# Patient Record
Sex: FEMALE | Race: OTHER | HISPANIC OR LATINO | URBAN - METROPOLITAN AREA
[De-identification: names, ages, dates, MRNs, and addresses within clinical notes are randomized per-mention and may not be internally consistent; named-entity substitution may affect disease eponyms.]

---

## 2022-05-04 ENCOUNTER — OUTPATIENT (OUTPATIENT)
Dept: OUTPATIENT SERVICES | Facility: HOSPITAL | Age: 27
LOS: 1 days | End: 2022-05-04
Payer: MEDICAID

## 2022-05-04 ENCOUNTER — RESULT REVIEW (OUTPATIENT)
Age: 27
End: 2022-05-04

## 2022-05-04 ENCOUNTER — APPOINTMENT (OUTPATIENT)
Dept: OBGYN | Facility: HOSPITAL | Age: 27
End: 2022-05-04
Payer: MEDICAID

## 2022-05-04 VITALS
BODY MASS INDEX: 47.38 KG/M2 | SYSTOLIC BLOOD PRESSURE: 144 MMHG | HEIGHT: 65 IN | HEART RATE: 93 BPM | WEIGHT: 284.4 LBS | TEMPERATURE: 97.6 F | DIASTOLIC BLOOD PRESSURE: 83 MMHG

## 2022-05-04 LAB
A1C WITH ESTIMATED AVERAGE GLUCOSE RESULT: 5.5 % — SIGNIFICANT CHANGE UP (ref 4–5.6)
ALBUMIN SERPL ELPH-MCNC: 4.3 G/DL — SIGNIFICANT CHANGE UP (ref 3.3–5)
ALP SERPL-CCNC: 65 U/L — SIGNIFICANT CHANGE UP (ref 40–120)
ALT FLD-CCNC: 22 U/L — SIGNIFICANT CHANGE UP (ref 4–33)
ANION GAP SERPL CALC-SCNC: 10 MMOL/L — SIGNIFICANT CHANGE UP (ref 7–14)
AST SERPL-CCNC: 17 U/L — SIGNIFICANT CHANGE UP (ref 4–32)
BASOPHILS # BLD AUTO: 0.05 K/UL — SIGNIFICANT CHANGE UP (ref 0–0.2)
BASOPHILS NFR BLD AUTO: 0.5 % — SIGNIFICANT CHANGE UP (ref 0–2)
BILIRUB SERPL-MCNC: <0.2 MG/DL — SIGNIFICANT CHANGE UP (ref 0.2–1.2)
BUN SERPL-MCNC: 9 MG/DL — SIGNIFICANT CHANGE UP (ref 7–23)
CALCIUM SERPL-MCNC: 9 MG/DL — SIGNIFICANT CHANGE UP (ref 8.4–10.5)
CHLORIDE SERPL-SCNC: 105 MMOL/L — SIGNIFICANT CHANGE UP (ref 98–107)
CO2 SERPL-SCNC: 23 MMOL/L — SIGNIFICANT CHANGE UP (ref 22–31)
CREAT SERPL-MCNC: 0.65 MG/DL — SIGNIFICANT CHANGE UP (ref 0.5–1.3)
EGFR: 124 ML/MIN/1.73M2 — SIGNIFICANT CHANGE UP
EOSINOPHIL # BLD AUTO: 0.18 K/UL — SIGNIFICANT CHANGE UP (ref 0–0.5)
EOSINOPHIL NFR BLD AUTO: 2 % — SIGNIFICANT CHANGE UP (ref 0–6)
ESTIMATED AVERAGE GLUCOSE: 111 — SIGNIFICANT CHANGE UP
GLUCOSE SERPL-MCNC: 98 MG/DL — SIGNIFICANT CHANGE UP (ref 70–99)
HCT VFR BLD CALC: 27.9 % — LOW (ref 34.5–45)
HGB BLD-MCNC: 8.4 G/DL — LOW (ref 11.5–15.5)
IANC: 5.64 K/UL — SIGNIFICANT CHANGE UP (ref 1.8–7.4)
IMM GRANULOCYTES NFR BLD AUTO: 0.4 % — SIGNIFICANT CHANGE UP (ref 0–1.5)
LYMPHOCYTES # BLD AUTO: 2.72 K/UL — SIGNIFICANT CHANGE UP (ref 1–3.3)
LYMPHOCYTES # BLD AUTO: 29.6 % — SIGNIFICANT CHANGE UP (ref 13–44)
MCHC RBC-ENTMCNC: 22.1 PG — LOW (ref 27–34)
MCHC RBC-ENTMCNC: 30.1 GM/DL — LOW (ref 32–36)
MCV RBC AUTO: 73.4 FL — LOW (ref 80–100)
MONOCYTES # BLD AUTO: 0.56 K/UL — SIGNIFICANT CHANGE UP (ref 0–0.9)
MONOCYTES NFR BLD AUTO: 6.1 % — SIGNIFICANT CHANGE UP (ref 2–14)
NEUTROPHILS # BLD AUTO: 5.64 K/UL — SIGNIFICANT CHANGE UP (ref 1.8–7.4)
NEUTROPHILS NFR BLD AUTO: 61.4 % — SIGNIFICANT CHANGE UP (ref 43–77)
NRBC # BLD: 0 /100 WBCS — SIGNIFICANT CHANGE UP
NRBC # FLD: 0 K/UL — SIGNIFICANT CHANGE UP
PLATELET # BLD AUTO: 357 K/UL — SIGNIFICANT CHANGE UP (ref 150–400)
POTASSIUM SERPL-MCNC: 3.8 MMOL/L — SIGNIFICANT CHANGE UP (ref 3.5–5.3)
POTASSIUM SERPL-SCNC: 3.8 MMOL/L — SIGNIFICANT CHANGE UP (ref 3.5–5.3)
PROT SERPL-MCNC: 7 G/DL — SIGNIFICANT CHANGE UP (ref 6–8.3)
RBC # BLD: 3.8 M/UL — SIGNIFICANT CHANGE UP (ref 3.8–5.2)
RBC # FLD: 16.6 % — HIGH (ref 10.3–14.5)
SODIUM SERPL-SCNC: 138 MMOL/L — SIGNIFICANT CHANGE UP (ref 135–145)
T4 FREE SERPL-MCNC: 1.2 NG/DL — SIGNIFICANT CHANGE UP (ref 0.9–1.8)
TSH SERPL-MCNC: 1.25 UIU/ML — SIGNIFICANT CHANGE UP (ref 0.27–4.2)
WBC # BLD: 9.19 K/UL — SIGNIFICANT CHANGE UP (ref 3.8–10.5)
WBC # FLD AUTO: 9.19 K/UL — SIGNIFICANT CHANGE UP (ref 3.8–10.5)

## 2022-05-04 PROCEDURE — 88141 CYTOPATH C/V INTERPRET: CPT

## 2022-05-04 PROCEDURE — 99203 OFFICE O/P NEW LOW 30 MIN: CPT | Mod: GE

## 2022-05-04 RX ORDER — CHOLECALCIFEROL (VITAMIN D3) 25 MCG
27-0.8-2 TABLET,CHEWABLE ORAL
Qty: 30 | Refills: 4 | Status: ACTIVE | COMMUNITY
Start: 2022-05-04 | End: 1900-01-01

## 2022-05-04 NOTE — HISTORY OF PRESENT ILLNESS
[FreeTextEntry1] :  #[id number]\par \par HPI: 28yo G0, LMP 12/8/21, who presents for x4mo of AUB. Endorses using x4-5 soaked pads/day since the start of her menses on the aforementioned date. Reports this has happened in the past when she was in [facility] and she was diagnosed with PCOS and was successfully treated w/ OCPs. Menarche was at 14yo and since then, she has had 1-2menses/ year lasting up to x3 weeks. Says she feels fatigued. Denies any chest pain or difficulty breathing. Was seen at an outside facility where diagnostics revealed bilateral ovarian cysts (3.1cm rt and 2.4cm lt) and anemia w/ a hgb of 10.3. \par \par Of note, desires to become pregnant. Has been trying for 5-6 years with partner \par \par Previous Ob/Gyn: Dr. Malone in [place]\par \par ObHx: G0\par GynHx: Reports nl pap 4yo ago. Left ovarian cyst as below. Denies any hx of STDs. Monogamous w/ male partner \par MedHx: Denies \par SurgHx: Left ovarian cystectomy @ 17yo in [facility] \par Meds: None \par Allergies: NKDA\par FamHx: Denies any family hx of uterine, ovarian, or breast cancer \par

## 2022-05-04 NOTE — PHYSICAL EXAM
[Chaperone Present] : A chaperone was present in the examining room during all aspects of the physical examination [Appropriately responsive] : appropriately responsive [Alert] : alert [No Acute Distress] : no acute distress [Soft] : soft [Non-tender] : non-tender [Non-distended] : non-distended [FreeTextEntry3] : Acanthosis nigracans appreciated. Hirsute upper lip noted  [FreeTextEntry1] : No vulvar lesions seen  [FreeTextEntry4] : Blood seen in the vault, cleared w/ scopettes  [FreeTextEntry5] : No lesions appreciated on cervix  [FreeTextEntry8] : Uterus mobile and non-tender. Adnexa non-tender and w/o fullness bilaterally. Exam limited 2/2 to habitus

## 2022-05-04 NOTE — PLAN
[FreeTextEntry1] : 28yo G0, LMP 12/8/2021, who presents for evaluation of longstanding hx of AUB\par \par #AUB\par - Likely 2/2 to PCOS given evidence of hirsutism and anovulation\par - WIll start pt on x1 week of Norethindrone acetate 10mg and have her have a TVUS the week after her withdrawal bleed\par - Labs ordered and drawn: TSH, HbA1c, CBC, CMP, and PRL\par - Recommended patient pursue weight loss\par \par #HM\par - Pap obtained\par - Pt declined STD testing\par \par Sagar Clarke, PGY-1\par d/w Dr. Valverde\par \par RTC after completion of TVUS

## 2022-05-05 DIAGNOSIS — N93.9 ABNORMAL UTERINE AND VAGINAL BLEEDING, UNSPECIFIED: ICD-10-CM

## 2022-05-09 LAB — CYTOLOGY SPEC DOC CYTO: SIGNIFICANT CHANGE UP

## 2022-05-12 ENCOUNTER — NON-APPOINTMENT (OUTPATIENT)
Age: 27
End: 2022-05-12

## 2022-05-29 ENCOUNTER — APPOINTMENT (OUTPATIENT)
Dept: ULTRASOUND IMAGING | Facility: IMAGING CENTER | Age: 27
End: 2022-05-29
Payer: MEDICAID

## 2022-05-29 ENCOUNTER — OUTPATIENT (OUTPATIENT)
Dept: OUTPATIENT SERVICES | Facility: HOSPITAL | Age: 27
LOS: 1 days | End: 2022-05-29
Payer: MEDICAID

## 2022-05-29 DIAGNOSIS — N93.9 ABNORMAL UTERINE AND VAGINAL BLEEDING, UNSPECIFIED: ICD-10-CM

## 2022-05-29 PROCEDURE — 76830 TRANSVAGINAL US NON-OB: CPT

## 2022-05-29 PROCEDURE — 76830 TRANSVAGINAL US NON-OB: CPT | Mod: 26

## 2022-06-02 ENCOUNTER — NON-APPOINTMENT (OUTPATIENT)
Age: 27
End: 2022-06-02

## 2022-07-20 ENCOUNTER — RESULT REVIEW (OUTPATIENT)
Age: 27
End: 2022-07-20

## 2022-07-20 ENCOUNTER — OUTPATIENT (OUTPATIENT)
Dept: OUTPATIENT SERVICES | Facility: HOSPITAL | Age: 27
LOS: 1 days | End: 2022-07-20

## 2022-07-20 ENCOUNTER — APPOINTMENT (OUTPATIENT)
Dept: OBGYN | Facility: HOSPITAL | Age: 27
End: 2022-07-20

## 2022-07-20 VITALS
HEIGHT: 65 IN | BODY MASS INDEX: 47.65 KG/M2 | SYSTOLIC BLOOD PRESSURE: 124 MMHG | DIASTOLIC BLOOD PRESSURE: 80 MMHG | WEIGHT: 286 LBS | HEART RATE: 87 BPM | TEMPERATURE: 97.6 F

## 2022-07-20 DIAGNOSIS — Z00.00 ENCOUNTER FOR GENERAL ADULT MEDICAL EXAMINATION W/OUT ABNORMAL FINDINGS: ICD-10-CM

## 2022-07-20 PROCEDURE — 99213 OFFICE O/P EST LOW 20 MIN: CPT | Mod: GE

## 2022-07-20 NOTE — PHYSICAL EXAM
[Chaperone Present] : A chaperone was present in the examining room during all aspects of the physical examination [Appropriately responsive] : appropriately responsive [Alert] : alert [No Acute Distress] : no acute distress [Soft] : soft [Non-tender] : non-tender [Non-distended] : non-distended [Oriented x3] : oriented x3 [Labia Majora] : normal [Labia Minora] : normal [Normal] : normal [Uterine Adnexae] : normal

## 2022-07-21 DIAGNOSIS — E28.2 POLYCYSTIC OVARIAN SYNDROME: ICD-10-CM

## 2022-07-21 DIAGNOSIS — Z00.00 ENCOUNTER FOR GENERAL ADULT MEDICAL EXAMINATION WITHOUT ABNORMAL FINDINGS: ICD-10-CM

## 2022-07-25 LAB — CYTOLOGY SPEC DOC CYTO: SIGNIFICANT CHANGE UP

## 2022-07-26 PROBLEM — Z00.00 ENCOUNTER FOR PREVENTIVE HEALTH EXAMINATION: Status: ACTIVE | Noted: 2022-04-29

## 2022-07-26 NOTE — PLAN
[FreeTextEntry1] : 26yo G0, LMP 6/29/22 with hx of PCOS who presents for follow up and repeat pap smear due to insufficient sample. Had provera withdrawal bleed, has not menstruated since. Lab work wnl, TVUS showing multiple cysts. Dx PCOS given hirsutism and TVUS. \par \par #PCOS\par - Pt desires to get pregnant. Declines OCPs or IUD at this time. Pt s/p Provera withdrawal bleed\par - Continue to keep menstrual diary \par - Pt encouraged to use ovulation predictor kits now that she has had a provera withdrawal bleed\par - Pt counseled on weight loss and exercise regimen \par \par #HCM\par - Repeat pap smear performed \par \par Alba Fishman, PGY2\par d/w Dr. Valverde

## 2022-07-26 NOTE — HISTORY OF PRESENT ILLNESS
[FreeTextEntry1] : HPI: 26yo G0, LMP 6/29/22 with hx of PCOS who presents for follow up and repeat pap smear due to insufficient sample. Pt was seen in May with AUB. Workup was sent and pt was sent for TVUS. Provera was also sent for the pt and she had a withdrawal bleed on 6/29/22. Pt has not menstruated since then. Pt is trying to become pregnant. \par \par TVUS: uterus 7.7cm x 4.1cm x 6.1 cm. Bilateral simple cysts, multiple. Largest measuring 2.8 x 3.4 x 2.6 cm. \par TSH, Free T4 wnl \par A1C 5.5%\par \par ObHx: G0\par GynHx: PCOS diagnosed in [facility] originally, treated with OCPs successfully. Hx of ovarian cysts. Menarche at age 13year old, 1-2 menses per year lasting about 3 weeks.  Denies any hx of STDs. Monogamous w/ male partner \par MedHx: Denies \par SurgHx: Left ovarian cystectomy @ 17yo in [facility] \par Meds: None \par Allergies: NKDA\par FamHx: Denies any family hx of uterine, ovarian, or breast cancer \par

## 2022-07-26 NOTE — REVIEW OF SYSTEMS
[Fever] : no fever [Chills] : no chills [Cough] : no cough [SOB on Exertion] : no shortness of breath on exertion [Chest Pain] : no chest pain [Palpitations] : no palpitations [Vomiting] : no vomiting [Nausea] : no nausea

## 2022-12-14 ENCOUNTER — RESULT REVIEW (OUTPATIENT)
Age: 27
End: 2022-12-14

## 2022-12-14 ENCOUNTER — OUTPATIENT (OUTPATIENT)
Dept: OUTPATIENT SERVICES | Facility: HOSPITAL | Age: 27
LOS: 1 days | End: 2022-12-14

## 2022-12-14 ENCOUNTER — APPOINTMENT (OUTPATIENT)
Dept: OBGYN | Facility: HOSPITAL | Age: 27
End: 2022-12-14

## 2022-12-14 VITALS
DIASTOLIC BLOOD PRESSURE: 92 MMHG | SYSTOLIC BLOOD PRESSURE: 145 MMHG | BODY MASS INDEX: 46.98 KG/M2 | TEMPERATURE: 98.1 F | WEIGHT: 282 LBS | HEIGHT: 65 IN | HEART RATE: 79 BPM

## 2022-12-14 DIAGNOSIS — E66.01 MORBID (SEVERE) OBESITY DUE TO EXCESS CALORIES: ICD-10-CM

## 2022-12-14 LAB
BASOPHILS # BLD AUTO: 0.08 K/UL — SIGNIFICANT CHANGE UP (ref 0–0.2)
BASOPHILS NFR BLD AUTO: 0.7 % — SIGNIFICANT CHANGE UP (ref 0–2)
EOSINOPHIL # BLD AUTO: 0.26 K/UL — SIGNIFICANT CHANGE UP (ref 0–0.5)
EOSINOPHIL NFR BLD AUTO: 2.3 % — SIGNIFICANT CHANGE UP (ref 0–6)
HCT VFR BLD CALC: 34.5 % — SIGNIFICANT CHANGE UP (ref 34.5–45)
HGB BLD-MCNC: 10.1 G/DL — LOW (ref 11.5–15.5)
IANC: 6.91 K/UL — SIGNIFICANT CHANGE UP (ref 1.8–7.4)
IMM GRANULOCYTES NFR BLD AUTO: 0.3 % — SIGNIFICANT CHANGE UP (ref 0–0.9)
LYMPHOCYTES # BLD AUTO: 28 % — SIGNIFICANT CHANGE UP (ref 13–44)
LYMPHOCYTES # BLD AUTO: 3.12 K/UL — SIGNIFICANT CHANGE UP (ref 1–3.3)
MCHC RBC-ENTMCNC: 20.3 PG — LOW (ref 27–34)
MCHC RBC-ENTMCNC: 29.3 GM/DL — LOW (ref 32–36)
MCV RBC AUTO: 69.3 FL — LOW (ref 80–100)
MONOCYTES # BLD AUTO: 0.74 K/UL — SIGNIFICANT CHANGE UP (ref 0–0.9)
MONOCYTES NFR BLD AUTO: 6.6 % — SIGNIFICANT CHANGE UP (ref 2–14)
NEUTROPHILS # BLD AUTO: 6.91 K/UL — SIGNIFICANT CHANGE UP (ref 1.8–7.4)
NEUTROPHILS NFR BLD AUTO: 62.1 % — SIGNIFICANT CHANGE UP (ref 43–77)
NRBC # BLD: 0 /100 WBCS — SIGNIFICANT CHANGE UP (ref 0–0)
NRBC # FLD: 0 K/UL — SIGNIFICANT CHANGE UP (ref 0–0)
PLATELET # BLD AUTO: 326 K/UL — SIGNIFICANT CHANGE UP (ref 150–400)
RBC # BLD: 4.98 M/UL — SIGNIFICANT CHANGE UP (ref 3.8–5.2)
RBC # FLD: 16.9 % — HIGH (ref 10.3–14.5)
WBC # BLD: 11.14 K/UL — HIGH (ref 3.8–10.5)
WBC # FLD AUTO: 11.14 K/UL — HIGH (ref 3.8–10.5)

## 2022-12-14 PROCEDURE — 99213 OFFICE O/P EST LOW 20 MIN: CPT | Mod: GC

## 2022-12-14 NOTE — PHYSICAL EXAM
[Appropriately responsive] : appropriately responsive [Alert] : alert [No Acute Distress] : no acute distress [No Lymphadenopathy] : no lymphadenopathy [Regular Rate Rhythm] : regular rate rhythm [Clear to Auscultation B/L] : clear to auscultation bilaterally [Soft] : soft [Non-tender] : non-tender [Non-distended] : non-distended [No Mass] : no mass [Oriented x3] : oriented x3

## 2022-12-15 DIAGNOSIS — E28.2 POLYCYSTIC OVARIAN SYNDROME: ICD-10-CM

## 2022-12-15 DIAGNOSIS — E66.01 MORBID (SEVERE) OBESITY DUE TO EXCESS CALORIES: ICD-10-CM

## 2022-12-15 DIAGNOSIS — N93.9 ABNORMAL UTERINE AND VAGINAL BLEEDING, UNSPECIFIED: ICD-10-CM

## 2022-12-15 DIAGNOSIS — R42 DIZZINESS AND GIDDINESS: ICD-10-CM

## 2022-12-15 NOTE — PLAN
[FreeTextEntry1] : 28 yo G0, LMP 11/1 who presents with AUB for the last 6 weeks and signs/symptoms of anemia. \par \par #AUB\par - Likely 2/2 to anovulatory cycles in the setting of PCOS\par - CBC today; if H/H less then 7/21, will alert patient about possible need for blood transfusion\par - Recommend Vit C and Fe daily in the setting of persistent menstrual bleeding\par \par #PCOS\par - Patient encouraged to use ovulation predictor kit once she is no longer bleeding\par - instructed to have intercourse every other day once ovulating, total 5 times. Then, after 2 weeks to take a urine pregnancy test if her period does not resume\par - Instructed patient to follow-up to clinic after that, regardless of UPT results\par - If UPT is negative, patient is a candidate for letrozole or for repeat provera challenge and to resume scheduled intercourse after her withdrawal bleeding\par - Pt counseled on importance of weight loss and exercise for PCOS as well as for future pregnancies\par \par \par Patient d/w Dr. Negron\par Sohail Nelson, PGY1

## 2022-12-15 NOTE — END OF VISIT
[] : Resident [FreeTextEntry3] : AUB in the setting of anovulatory cycles 2/2 PCOS\par Pt desires pregnancy, discussed timed intercourse\par Can consider ovulation induction in the future if needed\par \par giovanni HUANG

## 2022-12-15 NOTE — REVIEW OF SYSTEMS
[Palpitations] : palpitations [Abn Vaginal bleeding] : abnormal vaginal bleeding [Dizziness] : dizziness [Negative] : Musculoskeletal [Fever] : no fever [Chest Pain] : no chest pain [Headache] : no headache [Fainting] : no fainting

## 2022-12-15 NOTE — HISTORY OF PRESENT ILLNESS
[FreeTextEntry1] : 28 yo, G0, with PMHx of PCOS, LMP 11/1/22, who presents with continued vaginal bleeding since 11/1 and with questions about fertility. Patient was seen in May with AUB, and which time Provera was also sent for the pt, and she had a withdrawal bleed on 6/29/22. This is her first period since June. Patient still desires fertility and would like to know what her options are. \par Patient states she has had days of heavy bleeding, mixed with days of light bleeding. She reports mild lightheadedness/dizziness, particularly positional, but denies any fainting or LOC. Also denies any SOB or CP. Reports intermittent feelings of palpitations, although not in the last 48 hours. Denies any abdominal pain, N/V, or other complaints. \par \par 5/29/22: TVUS: uterus 7.7cm x 4.1cm x 6.1 cm. Bilateral simple cysts, multiple. Largest measuring 2.8 x 3.4 x 2.6 cm. \par \par ObHx: G0\par GynHx: PCOS diagnosed in [facility] originally, treated with OCPs successfully. Hx of ovarian cysts. Menarche at age 13year old, 1-2 menses per year lasting about 3 weeks. Denies any hx of STDs. Monogamous w/ male partner \par MedHx: Denies \par SurgHx: Left ovarian cystectomy @ 17yo in [facility] \par Meds: None \par Allergies: NKDA\par FamHx: Denies any family hx of uterine, ovarian, or breast cancer

## 2023-02-14 ENCOUNTER — RESULT REVIEW (OUTPATIENT)
Age: 28
End: 2023-02-14

## 2023-02-14 ENCOUNTER — OUTPATIENT (OUTPATIENT)
Dept: OUTPATIENT SERVICES | Facility: HOSPITAL | Age: 28
LOS: 1 days | End: 2023-02-14

## 2023-02-14 ENCOUNTER — APPOINTMENT (OUTPATIENT)
Dept: OBGYN | Facility: HOSPITAL | Age: 28
End: 2023-02-14
Payer: MEDICAID

## 2023-02-14 VITALS
TEMPERATURE: 97.5 F | SYSTOLIC BLOOD PRESSURE: 140 MMHG | HEIGHT: 65 IN | BODY MASS INDEX: 46.48 KG/M2 | HEART RATE: 94 BPM | WEIGHT: 279 LBS | DIASTOLIC BLOOD PRESSURE: 75 MMHG

## 2023-02-14 DIAGNOSIS — R42 DIZZINESS AND GIDDINESS: ICD-10-CM

## 2023-02-14 DIAGNOSIS — E28.2 POLYCYSTIC OVARIAN SYNDROME: ICD-10-CM

## 2023-02-14 LAB
BASOPHILS # BLD AUTO: 0.09 K/UL — SIGNIFICANT CHANGE UP (ref 0–0.2)
BASOPHILS NFR BLD AUTO: 0.6 % — SIGNIFICANT CHANGE UP (ref 0–2)
EOSINOPHIL # BLD AUTO: 0.31 K/UL — SIGNIFICANT CHANGE UP (ref 0–0.5)
EOSINOPHIL NFR BLD AUTO: 2 % — SIGNIFICANT CHANGE UP (ref 0–6)
HCT VFR BLD CALC: 28.5 % — LOW (ref 34.5–45)
HGB BLD-MCNC: 8.2 G/DL — LOW (ref 11.5–15.5)
IANC: 10.16 K/UL — HIGH (ref 1.8–7.4)
IMM GRANULOCYTES NFR BLD AUTO: 0.5 % — SIGNIFICANT CHANGE UP (ref 0–0.9)
LYMPHOCYTES # BLD AUTO: 23.1 % — SIGNIFICANT CHANGE UP (ref 13–44)
LYMPHOCYTES # BLD AUTO: 3.54 K/UL — HIGH (ref 1–3.3)
MCHC RBC-ENTMCNC: 20.1 PG — LOW (ref 27–34)
MCHC RBC-ENTMCNC: 28.8 GM/DL — LOW (ref 32–36)
MCV RBC AUTO: 70 FL — LOW (ref 80–100)
MONOCYTES # BLD AUTO: 1.18 K/UL — HIGH (ref 0–0.9)
MONOCYTES NFR BLD AUTO: 7.7 % — SIGNIFICANT CHANGE UP (ref 2–14)
NEUTROPHILS # BLD AUTO: 10.16 K/UL — HIGH (ref 1.8–7.4)
NEUTROPHILS NFR BLD AUTO: 66.1 % — SIGNIFICANT CHANGE UP (ref 43–77)
NRBC # BLD: 0 /100 WBCS — SIGNIFICANT CHANGE UP (ref 0–0)
NRBC # FLD: 0 K/UL — SIGNIFICANT CHANGE UP (ref 0–0)
PLATELET # BLD AUTO: 354 K/UL — SIGNIFICANT CHANGE UP (ref 150–400)
RBC # BLD: 4.07 M/UL — SIGNIFICANT CHANGE UP (ref 3.8–5.2)
RBC # FLD: 16.2 % — HIGH (ref 10.3–14.5)
WBC # BLD: 15.35 K/UL — HIGH (ref 3.8–10.5)
WBC # FLD AUTO: 15.35 K/UL — HIGH (ref 3.8–10.5)

## 2023-02-14 PROCEDURE — 99213 OFFICE O/P EST LOW 20 MIN: CPT | Mod: GC

## 2023-02-14 RX ORDER — FERROUS SULFATE TAB 325 MG (65 MG ELEMENTAL FE) 325 (65 FE) MG
325 (65 FE) TAB ORAL 3 TIMES DAILY
Refills: 0 | Status: ACTIVE | COMMUNITY

## 2023-02-14 RX ORDER — MEDROXYPROGESTERONE ACETATE 10 MG/1
10 TABLET ORAL DAILY
Qty: 10 | Refills: 0 | Status: ACTIVE | COMMUNITY
Start: 2023-02-14 | End: 1900-01-01

## 2023-02-14 RX ORDER — NORETHINDRONE ACETATE 5 MG/1
5 TABLET ORAL DAILY
Qty: 14 | Refills: 0 | Status: DISCONTINUED | COMMUNITY
Start: 2022-05-04 | End: 2023-02-14

## 2023-02-15 DIAGNOSIS — E28.2 POLYCYSTIC OVARIAN SYNDROME: ICD-10-CM

## 2023-02-15 DIAGNOSIS — R42 DIZZINESS AND GIDDINESS: ICD-10-CM

## 2023-02-15 DIAGNOSIS — N93.9 ABNORMAL UTERINE AND VAGINAL BLEEDING, UNSPECIFIED: ICD-10-CM

## 2023-02-15 RX ORDER — FERROUS SULFATE 325(65) MG
325 (65 FE) TABLET ORAL 3 TIMES DAILY
Qty: 90 | Refills: 2 | Status: ACTIVE | COMMUNITY
Start: 2023-02-15 | End: 1900-01-01

## 2023-02-15 NOTE — DISCUSSION/SUMMARY
[FreeTextEntry1] : 29yo G0 with PCOS presenting with 3 months of continuous heavy vaginal bleeding associated with dizziness.\par #Heavy vaginal bleeding\par - f/u CBC\par - Provera 10mg for 10 days to control bleeding. Counseled patient that she will have a withdrawal bleed and potential normalization of her cycles\par \par #Fertility\par - Patient counseled to start using daily ovulation kit 5-7 days after the end of her withdrawal bleed. When the test is positive, she should have sex every other day for a total of 5 times. \par \par RTC in 1 month. If she has continuous bleeding at the follow up appointment, obtain TVUS to rule out structural causes of AUB. \par \par Nubia Gracia, PGY1\par d/w Dr. Negron

## 2023-02-15 NOTE — REASON FOR VISIT
[Follow-Up] : a follow-up evaluation of [Abnormal Uterine Bleeding] : abnormal uterine bleeding [Time Spent: ____ minutes] : Total time spent using  services: [unfilled] minutes. The patient's primary language is not English thus required  services. [Interpreters_IDNumber] : [id number] [TWNoteComboBox1] : [place] citizen

## 2023-02-15 NOTE — HISTORY OF PRESENT ILLNESS
[N] : Patient does not use contraception [Y] : Patient is sexually active [Abnormal Quantity] : abnormal quantity [Abnormal Duration] : abnormal duration [___ Months] : [unfilled] months [Dizziness] : dizziness [Palpitations] : palpitations [FreeTextEntry1] : 29yo G0 with PCOS presenting with continuous heavy menstrual bleeding since November 1. She says that she soaks through a pad every 15 minutes. Over the past month she has had dizziness, palpitations, and SOB. Today she is feeling mild dizziness. She is actively trying to conceive. Previously, her menses were well-controlled on OCPs when she was in [facility].

## 2023-02-22 ENCOUNTER — OUTPATIENT (OUTPATIENT)
Dept: OUTPATIENT SERVICES | Facility: HOSPITAL | Age: 28
LOS: 1 days | Discharge: HOME | End: 2023-02-22

## 2023-02-22 DIAGNOSIS — D72.89 OTHER SPECIFIED DISORDERS OF WHITE BLOOD CELLS: ICD-10-CM

## 2023-02-23 ENCOUNTER — RESULT REVIEW (OUTPATIENT)
Age: 28
End: 2023-02-23

## 2023-02-23 ENCOUNTER — APPOINTMENT (OUTPATIENT)
Dept: HEMATOLOGY ONCOLOGY | Facility: CLINIC | Age: 28
End: 2023-02-23
Payer: MEDICAID

## 2023-02-23 VITALS
TEMPERATURE: 97.3 F | BODY MASS INDEX: 42.59 KG/M2 | HEIGHT: 67.72 IN | RESPIRATION RATE: 16 BRPM | SYSTOLIC BLOOD PRESSURE: 136 MMHG | OXYGEN SATURATION: 99 % | DIASTOLIC BLOOD PRESSURE: 89 MMHG | WEIGHT: 277.78 LBS | HEART RATE: 76 BPM

## 2023-02-23 LAB
BASOPHILS # BLD AUTO: 0.09 K/UL — SIGNIFICANT CHANGE UP (ref 0–0.2)
BASOPHILS NFR BLD AUTO: 0.8 % — SIGNIFICANT CHANGE UP (ref 0–2)
EOSINOPHIL # BLD AUTO: 0.28 K/UL — SIGNIFICANT CHANGE UP (ref 0–0.5)
EOSINOPHIL NFR BLD AUTO: 2.6 % — SIGNIFICANT CHANGE UP (ref 0–6)
HCT VFR BLD CALC: 30.1 % — LOW (ref 34.5–45)
HGB BLD-MCNC: 9 G/DL — LOW (ref 11.5–15.5)
IMM GRANULOCYTES NFR BLD AUTO: 0.8 % — SIGNIFICANT CHANGE UP (ref 0–0.9)
LYMPHOCYTES # BLD AUTO: 29.2 % — SIGNIFICANT CHANGE UP (ref 13–44)
LYMPHOCYTES # BLD AUTO: 3.16 K/UL — SIGNIFICANT CHANGE UP (ref 1–3.3)
MCHC RBC-ENTMCNC: 20.4 PG — LOW (ref 27–34)
MCHC RBC-ENTMCNC: 29.9 G/DL — LOW (ref 32–36)
MCV RBC AUTO: 68.3 FL — LOW (ref 80–100)
MONOCYTES # BLD AUTO: 0.66 K/UL — SIGNIFICANT CHANGE UP (ref 0–0.9)
MONOCYTES NFR BLD AUTO: 6.1 % — SIGNIFICANT CHANGE UP (ref 2–14)
NEUTROPHILS # BLD AUTO: 6.56 K/UL — SIGNIFICANT CHANGE UP (ref 1.8–7.4)
NEUTROPHILS NFR BLD AUTO: 60.5 % — SIGNIFICANT CHANGE UP (ref 43–77)
NRBC # BLD: 0 /100 WBCS — SIGNIFICANT CHANGE UP (ref 0–0)
PLATELET # BLD AUTO: 375 K/UL — SIGNIFICANT CHANGE UP (ref 150–400)
RBC # BLD: 4.41 M/UL — SIGNIFICANT CHANGE UP (ref 3.8–5.2)
RBC # FLD: 17.2 % — HIGH (ref 10.3–14.5)
RETICS #: 171 K/UL — HIGH (ref 25–125)
RETICS/RBC NFR: 3.8 % — HIGH (ref 0.5–2.5)
WBC # BLD: 10.84 K/UL — HIGH (ref 3.8–10.5)
WBC # FLD AUTO: 10.84 K/UL — HIGH (ref 3.8–10.5)

## 2023-02-23 PROCEDURE — 99205 OFFICE O/P NEW HI 60 MIN: CPT

## 2023-02-23 RX ORDER — SENNOSIDES 8.6 MG TABLETS 8.6 MG/1
8.6 TABLET ORAL
Qty: 120 | Refills: 1 | Status: ACTIVE | COMMUNITY
Start: 2023-02-23 | End: 1900-01-01

## 2023-02-23 NOTE — REASON FOR VISIT
[Initial Consultation] : an initial consultation for [Parent] : parent [[employer] ] : provided by [employer]   [FreeTextEntry2] : anemia [Interpreters_IDNumber] : [id number] [Interpreters_FullName] : Jesenia

## 2023-02-23 NOTE — HISTORY OF PRESENT ILLNESS
[de-identified] : 27 y/o F with hx of PCOS here for anemia. \par She is here today as she was told she had anemia and she was told she needed to go to a blood doctor. She has only known that she had anemia since last week when she saw her Ob/Gyn. She was "given 10 pills" from her other doctor in order to solve her hemorrhage. Of note patient is a very poor historian and she cannot tell me the names of her medications and she didn't bring them with her today. On questioning the 10 pills she is referring to was one daily pill for 10 days (on review of record this was likely Provera). She also reports that she also takes iron twice daily for the last 3 weeks. Patient has been suffering from menorrhagia since November 2022 and it stopped since she started the Provera. There were days that were more heavy than others - on the heaviest days she was having to change her pads every 15 minutes because it would become very full. She reported no symptoms of shortness of breath, but she does report a lot of dizziness when she is doing fast movement or bending. No syncope. Denied any chest pain or palpitations. No pica symptoms reported today. \par \par Other than indicated in the HPI, ROS otherwise unremarkable.

## 2023-02-23 NOTE — ASSESSMENT
[FreeTextEntry1] : 29 y/o F with abnormal uterine bleeding here for anemia. \par \par -Anemia is likely due to iron deficiency from uterine blood loss. \par -Symptoms are mild and patient's hemoglobin is uptrending since beginning PO iron therapy. Tolerating iron pills fairly well outside of very mild constipation, prescribed senna to be taken at night. \par -Discussed option of intravenous iron, however patient prefers to continue with the pills for now. \par -Will check iron studies, and in addition will check Hb EP, LDH, reticulocyte count. \par -Patient to return in 6 weeks with ACP to ensure hemoglobin and iron studies improving. If OK at that point can continue follow up q6months.\par -Patient understands and agrees with plan. All information explained to the best of my ability.

## 2023-02-24 LAB
ALBUMIN SERPL ELPH-MCNC: 4.3 G/DL
ALP BLD-CCNC: 83 U/L
ALT SERPL-CCNC: 18 U/L
ANION GAP SERPL CALC-SCNC: 11 MMOL/L
AST SERPL-CCNC: 14 U/L
BILIRUB SERPL-MCNC: 0.2 MG/DL
BUN SERPL-MCNC: 10 MG/DL
CALCIUM SERPL-MCNC: 9.3 MG/DL
CHLORIDE SERPL-SCNC: 104 MMOL/L
CO2 SERPL-SCNC: 22 MMOL/L
CREAT SERPL-MCNC: 0.69 MG/DL
EGFR: 122 ML/MIN/1.73M2
FERRITIN SERPL-MCNC: 11 NG/ML
GLUCOSE SERPL-MCNC: 98 MG/DL
IRON SATN MFR SERPL: 5 %
IRON SERPL-MCNC: 21 UG/DL
LDH SERPL-CCNC: 152 U/L
POTASSIUM SERPL-SCNC: 4.3 MMOL/L
PROT SERPL-MCNC: 7.8 G/DL
SODIUM SERPL-SCNC: 136 MMOL/L
TIBC SERPL-MCNC: 452 UG/DL
UIBC SERPL-MCNC: 431 UG/DL

## 2023-03-01 LAB
HGB A MFR BLD: 97.8 %
HGB A2 MFR BLD: 2.2 %
HGB FRACT BLD-IMP: NORMAL

## 2023-03-14 ENCOUNTER — OUTPATIENT (OUTPATIENT)
Dept: OUTPATIENT SERVICES | Facility: HOSPITAL | Age: 28
LOS: 1 days | End: 2023-03-14

## 2023-03-14 ENCOUNTER — RESULT REVIEW (OUTPATIENT)
Age: 28
End: 2023-03-14

## 2023-03-14 ENCOUNTER — APPOINTMENT (OUTPATIENT)
Dept: OBGYN | Facility: HOSPITAL | Age: 28
End: 2023-03-14
Payer: MEDICAID

## 2023-03-14 VITALS
HEIGHT: 67 IN | TEMPERATURE: 98.1 F | HEART RATE: 98 BPM | WEIGHT: 280 LBS | BODY MASS INDEX: 43.95 KG/M2 | DIASTOLIC BLOOD PRESSURE: 80 MMHG | SYSTOLIC BLOOD PRESSURE: 144 MMHG

## 2023-03-14 DIAGNOSIS — R10.32 LEFT LOWER QUADRANT PAIN: ICD-10-CM

## 2023-03-14 DIAGNOSIS — N93.9 ABNORMAL UTERINE AND VAGINAL BLEEDING, UNSPECIFIED: ICD-10-CM

## 2023-03-14 PROCEDURE — 99213 OFFICE O/P EST LOW 20 MIN: CPT | Mod: GC

## 2023-03-15 DIAGNOSIS — R10.32 LEFT LOWER QUADRANT PAIN: ICD-10-CM

## 2023-03-15 DIAGNOSIS — N93.9 ABNORMAL UTERINE AND VAGINAL BLEEDING, UNSPECIFIED: ICD-10-CM

## 2023-03-15 NOTE — HISTORY OF PRESENT ILLNESS
[FreeTextEntry1] : Patient is a 28y G0 with PCOS and reported history of fibroids presenting as a follow up for heavy vaginal bleeding.  Was seen previously in clinic on 2/14 for evaluation for heavy bleeding.  Stated that since November, she has been soaking through her pad every 15 minutes.  At time of 2/14 visit, she was prescribed Provera x10 days and referred to Hematology, who initiated her on PO iron.  Patient states that for the first 5 days of taking Provera, she had no bleeding and then had light bleeding for 5 days.  States bleeding continued as lighter than a period (reports she was changing her pad 3x/day, not soaked) until this past Friday 3/10, when bleeding ceased.  Now endorsing new intermittent sharp LLQ pain x4 days, worse with strenuous activity/lifting.  Endorses 4 episodes of nonbloody diarrhea yesterday that has since resolved.  Denies lightheadedness, dizziness, chest pain, shortness of breath, fevers, chills, sick contacts.  Patient is still actively trying to conceive, has not taken any home pregnancy tests, but does not believe she is pregnant.

## 2023-03-15 NOTE — END OF VISIT
[] : Resident [FreeTextEntry3] : Examined patient and small 1.5 cm cervical mass is c/w large nabothian cyst and not an aborting myoma. Mass is attached to cervix\par Patient desires to conceive so does not want hormonal management for her bleeding

## 2023-03-15 NOTE — PHYSICAL EXAM
[Chaperone Present] : A chaperone was present in the examining room during all aspects of the physical examination [Appropriately responsive] : appropriately responsive [Alert] : alert [No Acute Distress] : no acute distress [Soft] : soft [Non-tender] : non-tender [Non-distended] : non-distended [Oriented x3] : oriented x3 [Labia Majora] : normal [Labia Minora] : normal [Normal] : normal [FreeTextEntry1] : Ashley [FreeTextEntry7] : m [FreeTextEntry5] : firm, obstructing, cervical mass, no bleeding, no necrotic tissue, cervix colored, small attachment noted to cervix at 11:00 position

## 2023-03-15 NOTE — DISCUSSION/SUMMARY
[FreeTextEntry1] : Patient is a 28y G0 with PCOS and reported history of fibroids presenting as a follow up for heavy vaginal bleeding, now s/p Provera x10 days.  Reports bleeding has now stopped and has not had withdrawal bleed.  Newly endorses 4 days of intermittent LLQ pain.  Speculum exam notable for cervix colored, firm obstructing mass protruding through cervix, possible aborting myoma vs likely large nabothian cyst.\par \par #Vaginal bleeding\par - Now ceased s/p Provera without return of heavy vaginal bleeding\par - Was seen by hematology, feels well on PO iron\par - H/H increased at time of visit to hematologist\par \par #LLQ pain\par - Likely ovulatory as patient has not yet resumed menses\par - Nontender on exam\par - Will obtain TVUS to further evaluate source of pain\par \par #Cervical mass\par - DDx includes aborting myoma vs large nabothian cyst\par - No fibroids noted on recent TVUS, however reported history of fibroids\par - Will obtain TVUS to further evaluate mass and LLQ pain\par - Patient to follow up after TVUS in 4-6 weeks or sooner PRN\par \par #TTC\par - Patient plans to begin using ovulation predictor kits tonight in attempt to conceive\par - Will f/u with clinic if pregnant\par \par Patient seen and evaluated with ZAC Mcarthur PGY3 and Dr. Amos\par NIKOLAY Mcgrath PGY1

## 2023-03-15 NOTE — REASON FOR VISIT
[Follow-Up] : a follow-up evaluation of [[employer] ] : provided by [employer]   [Time Spent: ____ minutes] : Total time spent using  services: [unfilled] minutes. The patient's primary language is not English thus required  services. [Interpreters_IDNumber] : [id number] [Interpreters_FullName] : Rex [TWNoteComboBox1] : Afghan

## 2023-03-15 NOTE — REVIEW OF SYSTEMS
[Abdominal Pain] : abdominal pain [Diarrhea] : diarrhea [Fever] : no fever [Chills] : no chills [Dyspnea] : no dyspnea [SOB on Exertion] : no shortness of breath on exertion [Chest Pain] : no chest pain [Palpitations] : no palpitations [Constipation] : no constipation [Vomiting] : no vomiting [Dizziness] : no dizziness [FreeTextEntry7] : LLQ pain and diarrhea as above

## 2023-03-23 ENCOUNTER — OUTPATIENT (OUTPATIENT)
Dept: OUTPATIENT SERVICES | Facility: HOSPITAL | Age: 28
LOS: 1 days | End: 2023-03-23
Payer: MEDICAID

## 2023-03-23 ENCOUNTER — APPOINTMENT (OUTPATIENT)
Dept: ULTRASOUND IMAGING | Facility: CLINIC | Age: 28
End: 2023-03-23
Payer: MEDICAID

## 2023-03-23 DIAGNOSIS — R10.32 LEFT LOWER QUADRANT PAIN: ICD-10-CM

## 2023-03-23 PROCEDURE — 76830 TRANSVAGINAL US NON-OB: CPT

## 2023-03-23 PROCEDURE — 76830 TRANSVAGINAL US NON-OB: CPT | Mod: 26

## 2023-03-30 DIAGNOSIS — D64.9 ANEMIA, UNSPECIFIED: ICD-10-CM

## 2023-04-05 ENCOUNTER — APPOINTMENT (OUTPATIENT)
Dept: HEMATOLOGY ONCOLOGY | Facility: CLINIC | Age: 28
End: 2023-04-05

## 2023-12-07 ENCOUNTER — APPOINTMENT (OUTPATIENT)
Dept: OBGYN | Facility: HOSPITAL | Age: 28
End: 2023-12-07

## 2025-06-25 ENCOUNTER — APPOINTMENT (OUTPATIENT)
Dept: RHEUMATOLOGY | Facility: CLINIC | Age: 30
End: 2025-06-25